# Patient Record
Sex: FEMALE | URBAN - METROPOLITAN AREA
[De-identification: names, ages, dates, MRNs, and addresses within clinical notes are randomized per-mention and may not be internally consistent; named-entity substitution may affect disease eponyms.]

---

## 2017-08-03 ENCOUNTER — OFFICE VISIT (OUTPATIENT)
Dept: SURGERY | Age: 77
End: 2017-08-03

## 2017-08-03 VITALS
HEART RATE: 84 BPM | WEIGHT: 154 LBS | HEIGHT: 68 IN | DIASTOLIC BLOOD PRESSURE: 82 MMHG | SYSTOLIC BLOOD PRESSURE: 150 MMHG | BODY MASS INDEX: 23.34 KG/M2

## 2017-08-03 DIAGNOSIS — N61.0 MASTITIS, RIGHT, ACUTE: Primary | ICD-10-CM

## 2017-08-03 RX ORDER — BUDESONIDE AND FORMOTEROL FUMARATE DIHYDRATE 160; 4.5 UG/1; UG/1
2 AEROSOL RESPIRATORY (INHALATION) 2 TIMES DAILY
COMMUNITY

## 2017-08-03 RX ORDER — LOSARTAN POTASSIUM 100 MG/1
100 TABLET ORAL DAILY
COMMUNITY

## 2017-08-03 RX ORDER — CEPHALEXIN 500 MG/1
500 CAPSULE ORAL 4 TIMES DAILY
COMMUNITY

## 2017-08-03 RX ORDER — SULFAMETHOXAZOLE AND TRIMETHOPRIM 400; 80 MG/1; MG/1
1 TABLET ORAL 2 TIMES DAILY
COMMUNITY

## 2017-08-03 RX ORDER — CEPHALEXIN 500 MG/1
500 CAPSULE ORAL 2 TIMES DAILY
Qty: 28 CAP | Refills: 0 | Status: SHIPPED | OUTPATIENT
Start: 2017-08-03 | End: 2017-08-17

## 2017-08-03 NOTE — PROGRESS NOTES
HISTORY OF PRESENT ILLNESS  Rajeev Junior is a 65 y.o. female. HPI NEW patient self referral for consultation for RIGHT breast pain, redness and a palpable lump. Pt started with a fever (thought she had the flu) and then noticed pain and swelling in the RIGHT breast.  She was started on Keflex and Bactrim last Friday. The pain and swelling and fever have resolved but the skin is still discolored and she now feels a mass in the RIGHT breast.   No other breast changes. No nipple inversion or discharge. Obstetric History     No data available      Obstetric Comments   Menarche:  14. LMP:age 49. # of Children:  3  Age at Delivery of First Child: 22   Hysterectomy/oophorectomy: no/no. Breast Bx: no  Hx of Breast Feeding:  yes  BCP:  no. Hormone therapy: yes. No family history of breast or ovarian cancer  Mammogram in [facility] was reportedly normal.     Review of Systems   Constitutional: Positive for malaise/fatigue. HENT: Negative. Eyes: Negative. Respiratory: Negative. Cardiovascular: Negative. Gastrointestinal: Negative. Genitourinary: Negative. Musculoskeletal: Negative. Skin: Negative. Neurological: Negative. Endo/Heme/Allergies: Negative. Psychiatric/Behavioral: Negative. Physical Exam   Pulmonary/Chest: Right breast exhibits mass (3 cm soft mass 8:00 1/3 witwh overlying mild purple skin discoloration) and skin change. Right breast exhibits no inverted nipple, no nipple discharge and no tenderness. Left breast exhibits no inverted nipple, no mass, no nipple discharge, no skin change and no tenderness. Breasts are symmetrical.       Lymphadenopathy:     She has no cervical adenopathy. She has no axillary adenopathy. Right: No supraclavicular adenopathy present. Left: No supraclavicular adenopathy present. BREAST ULTRASOUND  Indication: Right  breast mass and erythema  Technique:   The area was scanned using a high-frequency linear-array near-field transducer  Findings: 3 cm hyperechoic area with surrounding edematous tissue. No abscess cavity. No suspicious mass  Impression: inflammation  Disposition: mastitis. No abscess. ASSESSMENT and PLAN    ICD-10-CM ICD-9-CM    1. Mastitis, right, acute N61.0 611.0      RIGHT breast mastitis. Pt took Keflex and Bactrim for 4 days. Symptoms improved dramatically. Her pain, fever and swelling have resolved. No abscess or suspicious mass seen on ultrasound. Pt will continue Keflex for a total of 10 days as the mass is still present.

## 2017-08-03 NOTE — MR AVS SNAPSHOT
Visit Information Date & Time Provider Department Dept. Phone Encounter #  
 8/3/2017  8:30 AM Nicho Daniels MD [address] [place] [address] [phone] [id number] Upcoming Health Maintenance Date Due DTaP/Tdap/Td series (1 - Tdap) 6/6/1960 ZOSTER VACCINE AGE 60> 4/6/1999 GLAUCOMA SCREENING Q2Y 6/6/2004 OSTEOPOROSIS SCREENING (DEXA) 6/6/2004 Pneumococcal 65+ Low/Medium Risk (1 of 2 - PCV13) 6/6/2004 MEDICARE YEARLY EXAM 6/6/2004 INFLUENZA AGE 9 TO ADULT 8/1/2017 Allergies as of 8/3/2017  Review Complete On: 8/3/2017 By: Nicho Daniels MD  
 No Known Allergies Current Immunizations  Never Reviewed No immunizations on file. Not reviewed this visit You Were Diagnosed With   
  
 Codes Comments Mastitis, right, acute    -  Primary ICD-10-CM: N61.0 ICD-9-CM: 611.0 Vitals BP Pulse Height(growth percentile) Weight(growth percentile) BMI OB Status 150/82 84 5' 8\" (1.727 m) 154 lb (69.9 kg) 23.42 kg/m2 Postmenopausal  
 Smoking Status Never Smoker BMI and BSA Data Body Mass Index Body Surface Area  
 23.42 kg/m 2 1.83 m 2 Preferred Pharmacy Pharmacy Name Phone [facility] [address] [place] [address], [address] [place] [address] Polina Aba [phone] Your Updated Medication List  
  
   
This list is accurate as of: 8/3/17  3:27 PM.  Always use your most recent med list.  
  
  
  
  
 BACTRIM  mg per tablet Generic drug:  trimethoprim-sulfamethoxazole Take 1 Tab by mouth two (2) times a day. COZAAR 100 mg tablet Generic drug:  losartan Take 100 mg by mouth daily. * KEFLEX 500 mg capsule Generic drug:  cephALEXin Take 500 mg by mouth four (4) times daily. * cephALEXin 500 mg capsule Commonly known as:  Eulas Po Take 1 Cap by mouth two (2) times a day for 14 days. SYMBICORT 160-4.5 mcg/actuation HFA inhaler Generic drug:  budesonide-formoterol Take 2 Puffs by inhalation two (2) times a day. * Notice: This list has 2 medication(s) that are the same as other medications prescribed for you. Read the directions carefully, and ask your doctor or other care provider to review them with you. Prescriptions Sent to Pharmacy Refills  
 cephALEXin (KEFLEX) 500 mg capsule 0 Sig: Take 1 Cap by mouth two (2) times a day for 14 days. Class: Normal  
 Pharmacy: [facility], [facility] #: [phone] Route: Oral  
  
Introducing [facility]! [employer] introduces [employer] patient portal. Now you can access parts of your medical record, email your doctor's office, and request medication refills online. 1. In your internet browser, go to [email or web address] 2. Click on the First Time User? Click Here link in the Sign In box. You will see the New Member Sign Up page. 3. Enter your [employer] Access Code exactly as it appears below. You will not need to use this code after youve completed the sign-up process. If you do not sign up before the expiration date, you must request a new code. · [employer] Access Code: [email or web address] Expires: 11/1/2017  8:35 AM 
 
4. Enter the last four digits of your Social Security Number (xxxx) and Date of Birth (mm/dd/yyyy) as indicated and click Submit. You will be taken to the next sign-up page. 5. Create a [employer] ID. This will be your [employer] login ID and cannot be changed, so think of one that is secure and easy to remember. 6. Create a [employer] password. You can change your password at any time. 7. Enter your Password Reset Question and Answer. This can be used at a later time if you forget your password. 8. Enter your e-mail address. You will receive e-mail notification when new information is available in [address] 9. Click Sign Up. You can now view and download portions of your medical record. 10. Click the Download Summary menu link to download a portable copy of your medical information. If you have questions, please visit the Frequently Asked Questions section of the [employer] website. Remember, [employer] is NOT to be used for urgent needs. For medical emergencies, dial 911. Now available from your iPhone and Android! Please provide this summary of care documentation to your next provider. If you have any questions after today's visit, please call [phone].